# Patient Record
Sex: MALE | Race: WHITE | NOT HISPANIC OR LATINO | Employment: PART TIME | URBAN - NONMETROPOLITAN AREA
[De-identification: names, ages, dates, MRNs, and addresses within clinical notes are randomized per-mention and may not be internally consistent; named-entity substitution may affect disease eponyms.]

---
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
## 2022-05-07 ENCOUNTER — HOSPITAL ENCOUNTER (OUTPATIENT)
Dept: GENERAL RADIOLOGY | Facility: OTHER | Age: 22
Discharge: HOME | End: 2022-05-07
Attending: NURSE PRACTITIONER
Payer: COMMERCIAL

## 2022-05-07 ENCOUNTER — OFFICE VISIT (OUTPATIENT)
Dept: FAMILY MEDICINE | Facility: OTHER | Age: 22
End: 2022-05-07
Attending: NURSE PRACTITIONER
Payer: COMMERCIAL

## 2022-05-07 VITALS
DIASTOLIC BLOOD PRESSURE: 78 MMHG | HEART RATE: 101 BPM | OXYGEN SATURATION: 98 % | SYSTOLIC BLOOD PRESSURE: 140 MMHG | RESPIRATION RATE: 16 BRPM | TEMPERATURE: 99 F | WEIGHT: 276 LBS

## 2022-05-07 DIAGNOSIS — S99.911A ANKLE INJURY, RIGHT, INITIAL ENCOUNTER: ICD-10-CM

## 2022-05-07 DIAGNOSIS — S82.61XA DISPLACED FRACTURE OF LATERAL MALLEOLUS OF RIGHT FIBULA, INITIAL ENCOUNTER FOR CLOSED FRACTURE: Primary | ICD-10-CM

## 2022-05-07 DIAGNOSIS — M25.471 PAIN AND SWELLING OF RIGHT ANKLE: ICD-10-CM

## 2022-05-07 DIAGNOSIS — M25.571 PAIN AND SWELLING OF RIGHT ANKLE: ICD-10-CM

## 2022-05-07 DIAGNOSIS — S93.401A SPRAIN OF RIGHT ANKLE, UNSPECIFIED LIGAMENT, INITIAL ENCOUNTER: ICD-10-CM

## 2022-05-07 PROCEDURE — 73610 X-RAY EXAM OF ANKLE: CPT | Mod: RT

## 2022-05-07 PROCEDURE — 99203 OFFICE O/P NEW LOW 30 MIN: CPT | Performed by: NURSE PRACTITIONER

## 2022-05-07 ASSESSMENT — PAIN SCALES - GENERAL: PAINLEVEL: SEVERE PAIN (6)

## 2022-05-07 NOTE — PATIENT INSTRUCTIONS
Wear walking boot at all times including sleeping    Continue to ice and elevate    Continue tylenol and ibuprofen for pain and swelling    Follow up with orthopedics - they will call to schedule  You will likely need physical therapy as well

## 2022-05-07 NOTE — PROGRESS NOTES
ASSESSMENT/PLAN:     I have reviewed the nursing notes.  I have reviewed the findings, diagnosis, plan and need for follow up with the patient.      1. Ankle injury, right, initial encounter    - XR Ankle Right G/E 3 Views    2. Pain and swelling of right ankle    - XR Ankle Right G/E 3 Views    3. Sprain of right ankle, unspecified ligament, initial encounter  4. Displaced fracture of lateral malleolus of right fibula, initial encounter for closed fracture    - Orthopedic  Referral; Future  - Ankle/Foot Bracing Supplies Order for DME - ONLY FOR DME    Xray of right ankle completed and personally reviewed, appears to have a spiral fracture of the distal shaft of the fibula with small avulsion fracture, radiologist over read:  There is an oblique fracture of the lateral malleolus. The distal fracture fragment is displaced posteriorly by 2 mm. Distal  tibia is intact. Talus and calcaneus are normal. The articular spaces are normal in height. Soft tissue swelling is seen at the ankle.      Patient placed in cam walking boot.  Instructed to wear at all times including with activity and sleeping.  WBAT.  Patient declines/refuses crutches.      May use over-the-counter Tylenol or ibuprofen TID PRN  Continue icing and elevating frequently     Referral to orthopedics - [facility].  Patient will likely need physical therapy as well.  Follow up with orthopedics within the next 2 weeks          I explained my diagnostic considerations and recommendations to the patient, who voiced understanding and agreement with the treatment plan. All questions were answered. We discussed potential side effects of any prescribed or recommended therapies, as well as expectations for response to treatments.    Noemy Stanford NP  [facility]      SUBJECTIVE:   Cecilio Dawson is a 21 year old male who presents to clinic today for the following health issues:  Right ankle injury    HPI  Brought to clinic today  by his father.  Information obtained by patient.  He slid into base during baseball 2 days ago and jammed his right foot/ankle.  Pain is persisting in the right ankle.  Rates about 6/10.  No pain in foot or toes.  No numbness or tingling.  Walking gingerly due to pain but states he does not feel like he needs crutches.  Significant swelling and bruising of his toes, foot, ankle, and calf.  Denies calf pain. Denies knee pain.  Denies any other injuries.    Icing, ice baths and elevating frequently   Alternating tylenol and ibuprofen        Past Medical History:   Diagnosis Date     Ill-defined closed fractures of upper limb     4/18/2007,Left fracture radius and ulna.  [Splint placement under IV sedation][     Normal delivery     Normal spontaneous vaginal delivery.  Apgars 9 and 9.  Birth weight 8 lb. 15 oz.  Bottle-fed.     Past Surgical History:   Procedure Laterality Date     OTHER SURGICAL HISTORY      04/18/07,778440,OTHER,Left fracture radius and ulna.  [Splint placement under IV sedation][     Social History     Tobacco Use     Smoking status: Never Smoker     Smokeless tobacco: Never Used   Substance Use Topics     Alcohol use: No     No current outpatient medications on file.     Allergies   Allergen Reactions     Cats Itching     Dogs Itching         Past medical history, past surgical history, current medications and allergies reviewed and accurate to the best of my knowledge.        OBJECTIVE:     BP (!) 140/78 (BP Location: Left arm, Patient Position: Sitting, Cuff Size: Adult Large)   Pulse 101   Temp 99  F (37.2  C) (Tympanic)   Resp 16   Wt 125.2 kg (276 lb)   SpO2 98%   There is no height or weight on file to calculate BMI.     Physical Exam  General Appearance: Well appearing male adolescent, appropriate appearance for age. No acute distress  Respiratory: normal chest wall and respirations.  Normal effort.  No cough appreciated.  Cardiac: CMS intact to right lower extremity with brisk  capillary refill, difficultly palpating pedal pulse due to extensive edema.  Right calf with swelling, non tender.    Musculoskeletal:  Equal movement of bilateral upper extremities.  Right calf, ankle, foot and toes with extensive swelling.  Right ankle with tenderness to palpation over medial and lateral sides.  Decreased right ankle ROM due to swelling and pain.  Right foot without tenderness to palpation.  Able to wiggle and move right toes without difficulty.   Normal gait.    Dermatological:  Right distal calf, ankle, dorsal foot, and toes with extensive bruising and ecchymosis, skin intact.    Psychological: normal affect, alert, oriented, and pleasant.       Imaging:  Results for orders placed or performed in visit on 05/07/22   XR Ankle Right G/E 3 Views     Status: None    Narrative    PROCEDURE:  XR ANKLE RIGHT G/E 3 VIEWS    HISTORY: Ankle injury, right, initial encounter; Pain and swelling of  right ankle; Pain and swelling of right ankle    COMPARISON:  None.    TECHNIQUE:  3 views of the right ankle were obtained.    FINDINGS:  There is an oblique fracture of the lateral malleolus. The  distal fracture fragment is displaced posteriorly by 2 mm. Distal  tibia is intact. Talus and calcaneus are normal. The articular spaces  are normal in height. Soft tissue swelling is seen at the ankle.       Impression    IMPRESSION: lateral malleolar fracture      MYRNA KELLER MD         SYSTEM ID:  [id number]

## 2022-05-07 NOTE — NURSING NOTE
"Chief Complaint   Patient presents with     Ankle Pain     Right       Patient presents to  with his dad. Patient stated he was sliding during softball on Thursday and he kind of jammed it into the bag.    Initial BP (!) 140/78 (BP Location: Left arm, Patient Position: Sitting, Cuff Size: Adult Large)   Pulse 101   Temp 99  F (37.2  C) (Tympanic)   Resp 16   Wt 125.2 kg (276 lb)   SpO2 98%  Estimated body mass index is 29.87 kg/m  as calculated from the following:    Height as of 7/23/18: 1.791 m (5' 10.5\").    Weight as of 7/23/18: 95.8 kg (211 lb 2 oz).  Medication Reconciliation: Completed     Advanced Care Directive Reviewed    Ernie Zabala LPN  "

## 2022-05-21 ENCOUNTER — HEALTH MAINTENANCE LETTER (OUTPATIENT)
Age: 22
End: 2022-05-21

## 2022-06-02 ENCOUNTER — HOSPITAL ENCOUNTER (OUTPATIENT)
Dept: GENERAL RADIOLOGY | Facility: OTHER | Age: 22
Discharge: HOME | End: 2022-06-02
Attending: PODIATRIST
Payer: COMMERCIAL

## 2022-06-02 ENCOUNTER — OFFICE VISIT (OUTPATIENT)
Dept: ORTHOPEDICS | Facility: OTHER | Age: 22
End: 2022-06-02
Attending: PODIATRIST
Payer: COMMERCIAL

## 2022-06-02 DIAGNOSIS — Z98.890 S/P ORIF (OPEN REDUCTION INTERNAL FIXATION) FRACTURE: ICD-10-CM

## 2022-06-02 DIAGNOSIS — Z87.81 S/P ORIF (OPEN REDUCTION INTERNAL FIXATION) FRACTURE: ICD-10-CM

## 2022-06-02 DIAGNOSIS — Z98.890 S/P ORIF (OPEN REDUCTION INTERNAL FIXATION) FRACTURE: Primary | ICD-10-CM

## 2022-06-02 DIAGNOSIS — Z87.81 S/P ORIF (OPEN REDUCTION INTERNAL FIXATION) FRACTURE: Primary | ICD-10-CM

## 2022-06-02 PROCEDURE — 99024 POSTOP FOLLOW-UP VISIT: CPT | Performed by: PODIATRIST

## 2022-06-02 PROCEDURE — 73610 X-RAY EXAM OF ANKLE: CPT | Mod: RT

## 2022-06-02 NOTE — PROGRESS NOTES
Visit Date: 2022    SUBJECTIVE:  Cecilio is here today, postop of ankle fracture, doing well, no acute concern.    PHYSICAL EXAMINATION:  Surgical site well coapted.  No signs of infection.  Minimal swelling.  CMS is intact.  Sutures removed.  Steri-Strips applied.    IMAGING:  Three views ankle demonstrate intact well-positioned hardware, good alignment and reduction.    ASSESSMENT:  Status post open reduction internal fixation ankle.    PLAN:  I discussed progression of treatment.  The patient is doing well.  He was placed in a boot.  Use crutches as long as need be, but in this boot.  Once he is comfortable, we can begin weightbearing, again in the boot only.  He can get his foot wet in the shower starting tomorrow.  Follow up in 4 weeks, repeat x-rays and progress as able.    Tomer Atkinson DPM        D: 2022   T: 2022   MT: CAL    Name:     CECILIO WALKER NILA  MRN:      [id number]        Account:    [id number]   :      2000           Visit Date: 2022     Document: [id number]

## 2022-06-02 NOTE — NURSING NOTE
"Chief Complaint   Patient presents with     RECHECK     S/P right ankle orif      Patient is here today for right ankle orif post op.     Shima Mckay LPN on 6/2/2022 at 9:46 AM       Initial There were no vitals taken for this visit. Estimated body mass index is 29.87 kg/m  as calculated from the following:    Height as of 7/23/18: 1.791 m (5' 10.5\").    Weight as of 7/23/18: 95.8 kg (211 lb 2 oz).  Medication Reconciliation: complete    Shima Mckay LPN  "

## 2022-06-23 DIAGNOSIS — Z87.81 S/P ORIF (OPEN REDUCTION INTERNAL FIXATION) FRACTURE: Primary | ICD-10-CM

## 2022-06-23 DIAGNOSIS — Z98.890 S/P ORIF (OPEN REDUCTION INTERNAL FIXATION) FRACTURE: Primary | ICD-10-CM

## 2022-06-30 ENCOUNTER — OFFICE VISIT (OUTPATIENT)
Dept: ORTHOPEDICS | Facility: OTHER | Age: 22
End: 2022-06-30
Attending: PODIATRIST
Payer: COMMERCIAL

## 2022-06-30 ENCOUNTER — HOSPITAL ENCOUNTER (OUTPATIENT)
Dept: GENERAL RADIOLOGY | Facility: OTHER | Age: 22
Discharge: HOME | End: 2022-06-30
Attending: PODIATRIST
Payer: COMMERCIAL

## 2022-06-30 DIAGNOSIS — Z09 POSTOPERATIVE FOLLOW-UP: ICD-10-CM

## 2022-06-30 DIAGNOSIS — Z87.81 S/P ORIF (OPEN REDUCTION INTERNAL FIXATION) FRACTURE: ICD-10-CM

## 2022-06-30 DIAGNOSIS — Z98.890 S/P ORIF (OPEN REDUCTION INTERNAL FIXATION) FRACTURE: Primary | ICD-10-CM

## 2022-06-30 DIAGNOSIS — Z87.81 S/P ORIF (OPEN REDUCTION INTERNAL FIXATION) FRACTURE: Primary | ICD-10-CM

## 2022-06-30 DIAGNOSIS — Z98.890 S/P ORIF (OPEN REDUCTION INTERNAL FIXATION) FRACTURE: ICD-10-CM

## 2022-06-30 PROCEDURE — 99024 POSTOP FOLLOW-UP VISIT: CPT | Performed by: SPECIALIST/TECHNOLOGIST

## 2022-06-30 PROCEDURE — 73610 X-RAY EXAM OF ANKLE: CPT | Mod: RT

## 2022-06-30 NOTE — PROGRESS NOTES
SUBJECTIVE:  Cecilio returns for 5 week follow-up of right ankle ORIF.  Doing well. Has been WBAT in boot only. He has been elevating and icing. No acute concerns today.     ROS: Musculoskeletal and general review of systems are negative, per review of previous clinic questionnaire.  Denies SOB and calf pain.    EXAM:   PHYSICAL EXAMINATION:   CONSTITUTIONAL:  The patient is alert and oriented x 3, well appearing and in no apparent distress.  Affect is pleasant and answers questions appropriately.  VASCULAR:  Circulation is intact with palpable pedal pulses and adequate capillary fill time to all digits.  Hair growth is present and appropriate to mid foot and digits. Calf nontender.  NEUROLOGIC:  Light touch sensation is intact to digits.  There is a negative Tinel sign.    INTEGUMENT:  No abnormal dermatologic lesions are noted.  Skin has normal texture and turgor. Incision well healed.   MUSCULOSKELETAL:  Swelling: Minimal  Range of motion appropriate for this recovery time.     IMAGING: three views right ankle WB demonstrate intact hardware. Interval healing through fracture site. No concerns radiographically    ASSESSMENT: s/p right ankle ORIF on 5/27/22 with Dr. Atkinson    PLAN OF CARE: Discussed progression of treatment. Continue WBAT in boot only. He was given orders for PT. He'd like to do this in [place]. He was also fitted for an ankle brace today. He should remain in his boot for the next 3-4 weeks, but can progress into ankle brace with supportive shoe at that time. I would like him to get some PT in before transitioning to brace. Follow up 4-6 weeks. With xray of right ankle WB    Susanne Gudino PA-C

## 2022-06-30 NOTE — NURSING NOTE
"Chief Complaint   Patient presents with     RECHECK     Right ankle ORIF recheck     Pt is here today for a recheck of his right ankle following an orif,.    Shima Mckay LPN on 6/30/2022 at 1:11 PM     Initial There were no vitals taken for this visit. Estimated body mass index is 29.87 kg/m  as calculated from the following:    Height as of 7/23/18: 1.791 m (5' 10.5\").    Weight as of 7/23/18: 95.8 kg (211 lb 2 oz).  Medication Reconciliation: complete    Shima Mckay LPN  "

## 2022-07-21 DIAGNOSIS — Z98.890 S/P ORIF (OPEN REDUCTION INTERNAL FIXATION) FRACTURE: Primary | ICD-10-CM

## 2022-07-21 DIAGNOSIS — Z87.81 S/P ORIF (OPEN REDUCTION INTERNAL FIXATION) FRACTURE: Primary | ICD-10-CM

## 2022-07-28 ENCOUNTER — HOSPITAL ENCOUNTER (OUTPATIENT)
Dept: GENERAL RADIOLOGY | Facility: OTHER | Age: 22
Discharge: HOME | End: 2022-07-28
Attending: PODIATRIST
Payer: COMMERCIAL

## 2022-07-28 ENCOUNTER — OFFICE VISIT (OUTPATIENT)
Dept: ORTHOPEDICS | Facility: OTHER | Age: 22
End: 2022-07-28
Attending: PODIATRIST
Payer: COMMERCIAL

## 2022-07-28 DIAGNOSIS — Z87.81 S/P ORIF (OPEN REDUCTION INTERNAL FIXATION) FRACTURE: ICD-10-CM

## 2022-07-28 DIAGNOSIS — Z98.890 S/P ORIF (OPEN REDUCTION INTERNAL FIXATION) FRACTURE: ICD-10-CM

## 2022-07-28 DIAGNOSIS — L03.031 PARONYCHIA OF TOE OF RIGHT FOOT: ICD-10-CM

## 2022-07-28 DIAGNOSIS — Z09 POSTOPERATIVE FOLLOW-UP: Primary | ICD-10-CM

## 2022-07-28 PROCEDURE — 73610 X-RAY EXAM OF ANKLE: CPT | Mod: RT

## 2022-07-28 PROCEDURE — 11730 AVULSION NAIL PLATE SIMPLE 1: CPT | Mod: 79 | Performed by: PODIATRIST

## 2022-07-28 PROCEDURE — 99213 OFFICE O/P EST LOW 20 MIN: CPT | Mod: 25 | Performed by: PODIATRIST

## 2022-07-28 PROCEDURE — 99024 POSTOP FOLLOW-UP VISIT: CPT | Performed by: PODIATRIST

## 2022-07-28 NOTE — NURSING NOTE
"Chief Complaint   Patient presents with     RECHECK     Right Ankle Recheck      Pt is here today for a recheck of his right ankle.     Shima Mckay LPN on 7/28/2022 at 8:18 AM       Initial There were no vitals taken for this visit. Estimated body mass index is 29.87 kg/m  as calculated from the following:    Height as of 7/23/18: 1.791 m (5' 10.5\").    Weight as of 7/23/18: 95.8 kg (211 lb 2 oz).  Medication Reconciliation: complete    Shima Mckay LPN  "

## 2022-07-28 NOTE — PROGRESS NOTES
Visit Date: 07/28/2022    HISTORY OF PRESENT ILLNESS:  Cecilio is a patient familiar to myself.  He is status post ORIF ankle, which he is here for, but also has a new problem, so he is billed today for a new problem.  He has a both border ingrown nail on this right foot, which is also the ORIF ankle.  The ankle is doing well.  He has no concerns here.  He has started therapy.  Recall, he goes to school down at [place], so is doing therapy down there.  Again, no concerns today.    HISTORY REVIEW:  I have reviewed this patient's past medical history, family history, social history as well as medications and allergies.  Any changes/additions were appropriately charted in the patient's electronic medical record.      PHYSICAL EXAMINATION:   CONSTITUTIONAL:  The patient is alert and oriented x3, well appearing and in no apparent distress.  Affect is pleasant and answers questions appropriately.  VASCULAR:  Circulation is intact with palpable pedal pulses and adequate capillary fill time to all digits.  Hair growth is present and appropriate to mid foot and digits.  NEUROLOGIC:  Light touch sensation is intact to digits.  There is a negative Tinel sign.  There are no signs of apparent nerve entrapment of superficial peroneal or common peroneal nerves.  INTEGUMENT:  No abnormal dermatologic lesions are noted.  Skin has normal texture and turgor.    MUSCULOSKELETAL:  Surgical site is well healed.  Good motion and muscle strength to the ankle.  No pain to palpate anywhere on this ankle, doing quite well.  His right hallux is also evaluated.  He has ingrown both borders of the right great toe, mild purulence here, certainly redness and paronychia is evident.    IMAGING:  Three views of the ankle demonstrate intact, well-positioned hardware, well healed ankle fracture, well aligned, ankle mortise intact.    ASSESSMENT:  Status post ankle ORIF ankle.  New diagnosis/new problem of right great toe ingrown nail, both  borders.    PLAN:  I discussed condition and treatment with the patient today.  As far as the ankle is concerned, he is released to progress as tolerated.  For the great toe, we did an in-office matrixectomy, both borders of the great toe, permanent fashion.  See procedure note to follow.  I discussed postprocedure cares, which he will do and follow up as needed.    PROCEDURE:  The patient's right hallux is anesthetized in ring block fashion with 1% lidocaine, it was sterilely cleaned and draped.  A tourniquet was applied, both nail borders freed subungually, nipper utilized to cut the borders of the toenail from distal to proximal that are nail based, offending borders removed on both sides, scraped with a curette, and 3 applications of phenol applied on both borders of the toe.  It was then neutralized with an alcohol flush.  Antibiotic ointment and a bandage applied.  He will keep this bandage on through the rest of the day and change to an antibiotic ointment and a Band-Aid.  Cares for this wound were discussed and he will need to do so for the next 3-4 weeks.  Follow up with any concerns, but he will progress as tolerated.  Otherwise, no need followup for the ankle, as this doing quite well.    A 20-minute consultation and procedure in addition.    Tomer Atkinson DPM        D: 2022   T: 2022   MT: BAUDILIO    Name:     AARONALIYAH WONG NILA  MRN:      [id number]        Account:    [id number]   :      2000           Visit Date: 2022     Document: [id number]

## 2022-09-17 ENCOUNTER — HEALTH MAINTENANCE LETTER (OUTPATIENT)
Age: 22
End: 2022-09-17

## 2023-06-04 ENCOUNTER — HEALTH MAINTENANCE LETTER (OUTPATIENT)
Age: 23
End: 2023-06-04

## 2024-07-13 ENCOUNTER — HEALTH MAINTENANCE LETTER (OUTPATIENT)
Age: 24
End: 2024-07-13

## 2025-07-19 ENCOUNTER — HEALTH MAINTENANCE LETTER (OUTPATIENT)
Age: 25
End: 2025-07-19

## (undated) RX ORDER — LIDOCAINE HYDROCHLORIDE 10 MG/ML
INJECTION, SOLUTION INFILTRATION; PERINEURAL
Status: DISPENSED
Start: 2022-07-28

## (undated) RX ORDER — GINSENG 100 MG
CAPSULE ORAL
Status: DISPENSED
Start: 2022-07-28